# Patient Record
Sex: MALE | Race: BLACK OR AFRICAN AMERICAN | NOT HISPANIC OR LATINO | Employment: UNEMPLOYED | URBAN - METROPOLITAN AREA
[De-identification: names, ages, dates, MRNs, and addresses within clinical notes are randomized per-mention and may not be internally consistent; named-entity substitution may affect disease eponyms.]

---

## 2023-03-16 ENCOUNTER — NURSING HOME VISIT (OUTPATIENT)
Dept: POST ACUTE CARE | Facility: EXTERNAL LOCATION | Age: 83
End: 2023-03-16
Payer: COMMERCIAL

## 2023-03-16 DIAGNOSIS — D63.8 ANEMIA OF CHRONIC DISEASE: ICD-10-CM

## 2023-03-16 DIAGNOSIS — E86.0 DEHYDRATION: ICD-10-CM

## 2023-03-16 DIAGNOSIS — N18.30 STAGE 3 CHRONIC KIDNEY DISEASE, UNSPECIFIED WHETHER STAGE 3A OR 3B CKD (MULTI): ICD-10-CM

## 2023-03-16 DIAGNOSIS — N28.9 WORSENING RENAL FUNCTION: Primary | ICD-10-CM

## 2023-03-16 PROCEDURE — 99309 SBSQ NF CARE MODERATE MDM 30: CPT | Performed by: FAMILY MEDICINE

## 2023-03-16 NOTE — LETTER
Patient: Mendez Lazo  : 1940    Encounter Date: 2023    Nursing Home Visit  Name: Mendez Lazo  YOB: 1940  MRN: [id number]    Chief Complaint  Lab review/dehydration  Subjective  HPI:   83 y/o male with hx of congenital deaf/muteness (basic ASL), BPH, prostate cancers/p brachytherapy (), CKD III (baseline Cr 1.8-2.2 , atrophic left kidney), DM 2 ( A1c 6.4), Rhabdomyolysis, LIZZIE, obstructive and reflux uropathy, hypoglycemia, Difficulty walking, falls hydronephrosis, urinary retention, OA, ASHD, HLD, glaucoma, primary parkinsonism, PVD, sacral wound, moderate malnutrition admitted to SNF after hospital stay for rhabdo, debility. Per hospital records; patient was found down by landlord after an unwitnessed fall and admitted for rhabdomyolysis (CK 1725), LIZZIE(Cr to 3.03), and hypoglycemia to 40. On imaging found to have urinary retention/obstruction concerning for worsening BPH, prostate cancer, and/or UTI as well as bowel dilation and obstruction, though patient reports regular bowel movements. Per history,fall seems mechanical in nature vs hypoglycemic. Admission CT head showed surface contusion and no acute hemorrhage or infarct. Elbow and hip x-rays similarly showed no fractures, but advanced osteoarthritis was present in the bilateral hips. Admission CT AP showed Moderate hydroureteronephrosis of the left kidney with the ureter dilated to the level of the pelvic brim where there is no extrinsic compression or mass, markedly distended urinary bladder concerning for urinary retention, and hyperdense lesions of the right kidney. Indwelling catheter was inserted with 500ml output and patient was started on .4 mg Tamsulosin QD. Patient failed 9/10 TOV and was recatheterized  and discharged to SNF with indwelling catheter.  renal ultrasound showed a 0.5 x 0.4 x 0.4 cm echogenic lesion in the right midpole with a tiny focus of vascularity which was indeterminate.  Radiology recommended considering dedicated renal MRI are CT protocol to Differentiate between a small angiomyolipoma or solid neoplasm such as renal cell carcinoma, repeat atrophy left kidney, bilateral simple renal cysts, and no hydronephrosis was seen. Outpatient follow-up with urology for management of indwelling catheter and right kidney lesion. Patient likely with metabolic abnormalities(LIZZIE, hypoglycemia, rhabdo) in setting of prolonged period down after fall. On presentation, patient Cr elevated to 3.03, LIZZIE likely post-renal/obstructive given imaging, though possibly pre-renal with hypovolemia/infection, resolved to patients baseline of 2.1 by discharge. Rhabdomyolysis resolved as well after 2L fluid bolus. Patients course was complicated by a sacral decubitus ulcer, which was present on admission, but worsened during stay. Wound care nurse recommended ACS evaluation, and patient was taken to OR on 9/15 for debridement. Wound had 5cc of purulent fluid, and wound culture was sent; no obvious infection noted. Patient also found to have glucose of 40 on presentation which resolved with ampule of D 50. Patients 9/6 Hg A1c was 6.6, likely that patient had multiple hypoglycemic episodes on home regimen of Tresieba 15u AT BEDTIME, patient sent out on new regimen of 7 units Lantus. Patient was also normotensive on just home verapamil 120 mg BID, did not require home lisinopril 10 mg EVERY DAY or HYDROCHLOROTHIAZIDE 12.5 mg EVERY DAY for adequate control, which were held in setting of patients LIZZIE. Issues for follow-up. Wound care, please turn/reposition patient every 2 hours. Wound dressing needs to be changed twice a day with Kerlix barely soaking in Dakin's. Hypertension medication management and BPH follow-up with PCP in 2 weeks antihypertensive held 2/2 LIZZIE. Diabetes control follow-up with endocrine in 2 weeks LIZZIE on CKD III follow-up with nephrology in 2 weeks Indwelling catheter and right kidney lesion  follow-up with urology in 2 weeks. Failed trial of void. He was discharged to SNF on 9/17. Lost his appeal and was discharged to LTC and transferred to [facility] on 10/14. Has PICC line and Raphael. On 10/17 Patient's blood sugars noted to be elevated to a high of 787. Labs, UA were stable.  10/18 Patient is Difficult to converse with 2/2 him being deaf and unable to read. Was able to get out of him he was hungry. Appetite fair to good. Does not appear in pain.  10/20 Patient still with hyperglycemia, blood culture/wound culture done CBC w/DIFF results show anemia HGB (10.5), BC negative, wound culture Corynebacterium Striatum (negative). Appetite fair to good. Per Nsg is not in pain, stable. Patient's brother is here and wants to talk  10/25 Patient sitting in chair. He was able to make it known he wanted to get back into bed. Appetite good does not appear in pain  11/3 Patient stable, appetite good. Does not appear in pain. Is deaf mute and cannot read is difficult to communicate. Labs ordered for today  11/22 Patient stable had labs done on 11/17. Is stable, appetite good, Does not appear in distress.  11/29 Patient sitting room. He looks to be in good spirits and no distress or pain. Appetite good. Labs drawn 11/23 12/1 Patient sitting in chair, appetite fair to poor, but he is smiling trying to communicate. Has IV fluids running. Not sure which bag he is on. Hew does not look to be in distress. Has raphael catheter that intact  12/6 Patient in room, NAD, smiling. Appetite poor. Midline is partially dislodged.  12/8 Patient in room in good mood Appetite remains fair to poor. Attempting to communicate with patient while brother was in room. Brother was disruptive, making communicating with patient very difficult. Labs ordered  1/5 Notified of patient's labs yesterday. They were stable. He is in good spirits, Is deaf/mute and difficult to communicate with. He recently has had to have several liters IVF over the  past 1-2 months. He does not appear in pain or discomfort. Appetite is good per PCC.  1/26 Patient stable, sitting up in chair. In good spirits, appetite good. Denies pain or discomfort. Labs drawn today  2/28 Patient is sitting in chair, p/pleasant. No grimacing or pain noted. Labs drawn yesterday.  3/2 Notified by nursing patient complaining of abdominal discomfort. No urine noted in raphael. Nursing changed the raphael, minimal urine out. KUB and labs ordered. Saw patient later in d/ay. KUB did not mention bladder but did note bladder was mildly distended according to x-ray. No urine noted in raphael bag. Instructed nurse to irrigate raphael back and notify me with results. Patient in distress and uncomfortable. One unit of fluids per IV given to patient over night.   3/16  Patient stable, today labs have improved. Was given 1L of NS for worsening Bun/Cr.  Today labs look better, He is in good spirits, no c/o pain or discomfort. Patient is deaf/mute, does not read.      Review of Systems:  Reviewed chart looking at current medications, treatment, labs and x-rays and note pertinent positives or negatives, otherwise 10 point system review negative except for what is noted in HPI.    Objective  VS: 129/78, 97.7, 78, 18, 98%, 146#  Physical exam:   Physical Exam   Constitutional: General appearance: comfortable and appears stated age.  Head and Face: NC, AT.  Eyes: Sclera clear; No drainage.  Ears, Nose, Mouth, and Throat: No ear or nasal drainage. Deaf, does not read, communicates with gestures, basic ASL. MMM, poor dentition.  Pulmonary: Air exchange and excursion adequate.  Cardiovascular: No peripheral edema.  Abdomen: lower abdomen mildly distended, uncomfortable for patient.  Genitourinary: Raphael draining clear yellow urine.  Musculoskeletal: No gross bony deformities, no kyphosis.  Skin: skin with right foot wound and sacral wound not observed, per Nsg and wound team.  Neurologic: unable to assess. Patient is  deaf/mute. gait not observed.  Psychiatric: Calm and pleasant.    Assessment/Pl82 y/o male with hx of congenital deaf/muteness (basic ASL), BPH, prostate cancer s/p brachytherapy (2008), CKD III (baseline Cr 1.8-2.2 1/22, atrophic left kidney), DM 2 (1/22 A1c 6.4), Rhabdomyolysis, LIZZIE, obstructive and reflux uropathy, hypoglycemia, difficulty walking, falls hydronephrosis, urinary retention, OA, ASHD, HLD, glaucoma, primary parkinsonism, PVD, sacral wound, moderate malnutrition admitted to SNF after hospital stay for rhabdo, debility.  * Unable to urinate, abdomen mildly distended, nursing unable to irrigate raphael. Patient transferred to [facility].  * Hyperkalemia- K+4.4, Improved after fluids  * Worsening renal function BUN/Cr- improved 49/2.21, today  * Hyponatremia- Na+ 137, better after fluids  * Hyperglycemia/DM II- -200s c/w Lantus insulin and Lispro for coverage, no change  * Debility- therapy to maximize safety and independence with functional mobility and self-care. Is functioning well  * HTN, ASHD, HLD- VSS c/w verapamil, ASA, atorvastatin.  * BPH/obstructive uropathy- c/w Flomax, and Raphael catheter.  * Anemia- H/H 8.4/27.7 down trending, monitor repeat next week  * Sacral wound/foot wound- per Nsg/wound team  * Knee right knee/low back pain- c/w lidocaine patch, routine Tylenol, gabapentin  * Glaucoma- c/w with eye gtts.  Had consult for renal trending BUN/Cr, labs chronically poor  Labs for next week    Blanca Stafford CNP    Electronically Signed By: KATE Easley-CNP   3/26/23 12:26 PM

## 2023-03-21 ENCOUNTER — NURSING HOME VISIT (OUTPATIENT)
Dept: POST ACUTE CARE | Facility: EXTERNAL LOCATION | Age: 83
End: 2023-03-21
Payer: COMMERCIAL

## 2023-03-21 DIAGNOSIS — N18.30 STAGE 3 CHRONIC KIDNEY DISEASE, UNSPECIFIED WHETHER STAGE 3A OR 3B CKD (MULTI): Primary | ICD-10-CM

## 2023-03-21 DIAGNOSIS — D63.8 ANEMIA OF CHRONIC DISEASE: ICD-10-CM

## 2023-03-21 PROCEDURE — 99309 SBSQ NF CARE MODERATE MDM 30: CPT | Performed by: FAMILY MEDICINE

## 2023-03-21 NOTE — LETTER
Patient: Mendez Lazo  : 1940    Encounter Date: 2023    Nursing Home Visit  Name: Mendez Lazo  YOB: 1940  MRN: [id number]    Chief Complaint  Lab Review  Subjective  HPI:   83 y/o male with hx of congenital deaf/muteness (basic ASL), BPH, prostate cancers/p brachytherapy (), CKD III (baseline Cr 1.8-2.2 , atrophic left kidney), DM 2 ( A1c 6.4), Rhabdomyolysis, LIZZIE, obstructive and reflux uropathy, hypoglycemia, Difficulty walking, falls hydronephrosis, urinary retention, OA, ASHD, HLD, glaucoma, primary parkinsonism, PVD, sacral wound, moderate malnutrition admitted to SNF after hospital stay for rhabdo, debility. Per hospital records; patient was found down by landlord after an unwitnessed fall and admitted for rhabdomyolysis (CK 1725), LIZZIE(Cr to 3.03), and hypoglycemia to 40. On imaging found to have urinary retention/obstruction concerning for worsening BPH, prostate cancer, and/or UTI as well as bowel dilation and obstruction, though patient reports regular bowel movements. Per history,fall seems mechanical in nature vs hypoglycemic. Admission CT head showed surface contusion and no acute hemorrhage or infarct. Elbow and hip x-rays similarly showed no fractures, but advanced osteoarthritis was present in the bilateral hips. Admission CT AP showed Moderate hydroureteronephrosis of the left kidney with the ureter dilated to the level of the pelvic brim where there is no extrinsic compression or mass, markedly distended urinary bladder concerning for urinary retention, and hyperdense lesions of the right kidney. Indwelling catheter was inserted with 500ml output and patient was started on .4 mg Tamsulosin QD. Patient failed 9/10 TOV and was recatheterized  and discharged to SNF with indwelling catheter.  renal ultrasound showed a 0.5 x 0.4 x 0.4 cm echogenic lesion in the right midpole with a tiny focus of vascularity which was indeterminate. Radiology  recommended considering dedicated renal MRI are CT protocol to Differentiate between a small angiomyolipoma or solid neoplasm such as renal cell carcinoma, repeat atrophy left kidney, bilateral simple renal cysts, and no hydronephrosis was seen. Outpatient follow-up with urology for management of indwelling catheter and right kidney lesion. Patient likely with metabolic abnormalities(LIZZIE, hypoglycemia, rhabdo) in setting of prolonged period down after fall. On presentation, patient Cr elevated to 3.03, LIZZIE likely post-renal/obstructive given imaging, though possibly pre-renal with hypovolemia/infection, resolved to patients baseline of 2.1 by discharge. Rhabdomyolysis resolved as well after 2L fluid bolus. Patients course was complicated by a sacral decubitus ulcer, which was present on admission, but worsened during stay. Wound care nurse recommended ACS evaluation, and patient was taken to OR on 9/15 for debridement. Wound had 5cc of purulent fluid, and wound culture was sent; no obvious infection noted. Patient also found to have glucose of 40 on presentation which resolved with ampule of D 50. Patients 9/6 Hg A1c was 6.6, likely that patient had multiple hypoglycemic episodes on home regimen of Tresieba 15u AT BEDTIME, patient sent out on new regimen of 7 units Lantus. Patient was also normotensive on just home verapamil 120 mg BID, did not require home lisinopril 10 mg EVERY DAY or HYDROCHLOROTHIAZIDE 12.5 mg EVERY DAY for adequate control, which were held in setting of patients LIZZIE. Issues for follow-up. Wound care, please turn/reposition patient every 2 hours. Wound dressing needs to be changed twice a day with Kerlix barely soaking in Dakin's. Hypertension medication management and BPH follow-up with PCP in 2 weeks antihypertensive held 2/2 LIZZIE. Diabetes control follow-up with endocrine in 2 weeks LIZZIE on CKD III follow-up with nephrology in 2 weeks Indwelling catheter and right kidney lesion follow-up with  urology in 2 weeks. Failed trial of void. He was discharged to SNF on 9/17. Lost his appeal and was discharged to LTC and transferred to [facility] on 10/14. Has PICC line and Raphael. On 10/17 Patient's blood sugars noted to be elevated to a high of 787. Labs, UA were stable.  10/18 Patient is Difficult to converse with 2/2 him being deaf and unable to read. Was able to get out of him he was hungry. Appetite fair to good. Does not appear in pain.  10/20 Patient still with hyperglycemia, blood culture/wound culture done CBC w/DIFF results show anemia HGB (10.5), BC negative, wound culture Corynebacterium Striatum (negative). Appetite fair to good. Per Nsg is not in pain, stable. Patient's brother is here and wants to talk  10/25 Patient sitting in chair. He was able to make it known he wanted to get back into bed. Appetite good does not appear in pain  11/3 Patient stable, appetite good. Does not appear in pain. Is deaf mute and cannot read is difficult to communicate. Labs ordered for today  11/22 Patient stable had labs done on 11/17. Is stable, appetite good, Does not appear in distress.  11/29 Patient sitting room. He looks to be in good spirits and no distress or pain. Appetite good. Labs drawn 11/23 12/1 Patient sitting in chair, appetite fair to poor, but he is smiling trying to communicate. Has IV fluids running. Not sure which bag he is on. Hew does not look to be in distress. Has raphael catheter that intact  12/6 Patient in room, NAD, smiling. Appetite poor. Midline is partially dislodged.  12/8 Patient in room in good mood Appetite remains fair to poor. Attempting to communicate with patient while brother was in room. Brother was disruptive, making communicating with patient very difficult. Labs ordered  1/5 Notified of patient's labs yesterday. They were stable. He is in good spirits, Is deaf/mute and difficult to communicate with. He recently has had to have several liters IVF over the past 1-2 months.  "He does not appear in pain or discomfort. Appetite is good per PCC.  1/26 Patient stable, sitting up in chair. In good spirits, appetite good. Denies pain or discomfort. Labs drawn today  2/28 Patient is sitting in chair, p/pleasant. No grimacing or pain noted. Labs drawn yesterday.  3/2 Notified by nursing patient complaining of abdominal discomfort. No urine noted in raphael. Nursing changed the raphael, minimal urine out. KUB and labs ordered. Saw patient later in d/ay. KUB did not mention bladder but did note bladder was mildly distended according to x-ray. No urine noted in raphael bag. Instructed nurse to irrigate raphael back and notify me with results. Patient in distress and uncomfortable. One unit of fluids per IV given to patient over night.   3/16  Patient stable, today labs have improved. Was given 1L of NS for worsening Bun/Cr.  Today labs look better, He is in good spirits, no c/o pain or discomfort. Patient is deaf/mute, does not read.   3/21  Patient sitting out in common area, is in good spirits.  Labs remain stable. Appetite is good. Shakes head \"yes\" when asked if he is feeling good.        Review of Systems:  Reviewed chart looking at current medications, treatment, labs and x-rays and note pertinent positives or negatives, otherwise 10 point system review negative except for what is noted in HPI.    Objective    VS: /78   Pulse 78   Temp 36.4 °C (97.6 °F)   Resp 18   Wt 66.2 kg (146 lb)   SpO2 97%     Physical exam:   Physical Exam   Constitutional: General appearance: comfortable and appears stated age.  Head and Face: NC, AT.  Eyes: Sclera clear; No drainage.  Ears, Nose, Mouth, and Throat: No ear or nasal drainage. Deaf, does not read, communicates with gestures, basic ASL. MMM, poor dentition.  Pulmonary: Air exchange and excursion adequate.  Cardiovascular: No peripheral edema.  Abdomen: lower abdomen mildly distended, no longer painful.  Genitourinary: Raphael draining clear yellow " urine.  Musculoskeletal: No gross bony deformities, no kyphosis.  Skin: skin with right foot wound and sacral wound not observed, per Nsg and wound team.  Neurologic: unable to assess. Patient is deaf/mute. gait not observed.  Psychiatric: Calm and pleasant.    Assessment/Plan   83 y/o male with hx of congenital deaf/muteness (basic ASL), BPH, prostate cancer s/p brachytherapy (2008), CKD III (baseline Cr 1.8-2.2 1/22, atrophic left kidney), DM 2 (1/22 A1c 6.4), Rhabdomyolysis, LIZZIE, obstructive and reflux uropathy, hypoglycemia, difficulty walking, falls hydronephrosis, urinary retention, OA, ASHD, HLD, glaucoma, primary parkinsonism, PVD, sacral wound, moderate malnutrition admitted to SNF after hospital stay for rhabdo, debility.  * CKD III- Creatinine trending down, BUN remains elevated, Chlor/C02- mildly abnormal 2/2 CKD  * Hyperkalemia- K+4.3, stable  * Hyponatremia- Na+ 138 stable  * Hyperglycemia/DM II- -200s c/w Lantus insulin and Lispro for coverage, no change  * Debility- therapy to maximize safety and independence with functional mobility and self-care. Is functioning well  * HTN, ASHD, HLD- VSS c/w verapamil, ASA, atorvastatin.  * BPH/obstructive uropathy- c/w Flomax, and Smith catheter.  * Anemia 2/2 chronic disease H/H 8.7/28.5   * Sacral wound/foot wound- per Nsg/wound team  * Knee right knee/low back pain- c/w lidocaine patch, routine Tylenol, gabapentin  * Glaucoma- c/w with eye gtts.  Had consult for renal trending BUN/Cr, labs chronically poor, unsure if patient went, unable to locate documentation  Labs for next week    Blanca Stafford CNP       Electronically Signed By: KATE Easley-CNP   3/27/23  2:16 PM

## 2023-03-23 ENCOUNTER — NURSING HOME VISIT (OUTPATIENT)
Dept: POST ACUTE CARE | Facility: EXTERNAL LOCATION | Age: 83
End: 2023-03-23
Payer: COMMERCIAL

## 2023-03-23 PROCEDURE — 99310 SBSQ NF CARE HIGH MDM 45: CPT | Performed by: FAMILY MEDICINE

## 2023-03-23 NOTE — LETTER
Patient: Mendez Lazo  : 1940    Encounter Date: 2023    Nursing Home Visit  Name: Mendez Lazo  YOB: 1940  MRN: [id number]     Chief Complaint  Lab Review  Subjective  HPI:   81 y/o male with hx of congenital deaf/muteness (basic ASL), BPH, prostate cancers/p brachytherapy (), CKD III (baseline Cr 1.8-2.2 , atrophic left kidney), DM 2 ( A1c 6.4), Rhabdomyolysis, LIZZIE, obstructive and reflux uropathy, hypoglycemia, Difficulty walking, falls hydronephrosis, urinary retention, OA, ASHD, HLD, glaucoma, primary parkinsonism, PVD, sacral wound, moderate malnutrition admitted to SNF after hospital stay for rhabdo, debility. Per hospital records; patient was found down by landlord after an unwitnessed fall and admitted for rhabdomyolysis (CK 1725), LIZZIE(Cr to 3.03), and hypoglycemia to 40. On imaging found to have urinary retention/obstruction concerning for worsening BPH, prostate cancer, and/or UTI as well as bowel dilation and obstruction, though patient reports regular bowel movements. Per history,fall seems mechanical in nature vs hypoglycemic. Admission CT head showed surface contusion and no acute hemorrhage or infarct. Elbow and hip x-rays similarly showed no fractures, but advanced osteoarthritis was present in the bilateral hips. Admission CT AP showed Moderate hydroureteronephrosis of the left kidney with the ureter dilated to the level of the pelvic brim where there is no extrinsic compression or mass, markedly distended urinary bladder concerning for urinary retention, and hyperdense lesions of the right kidney. Indwelling catheter was inserted with 500ml output and patient was started on .4 mg Tamsulosin QD. Patient failed 9/10 TOV and was recatheterized  and discharged to SNF with indwelling catheter.  renal ultrasound showed a 0.5 x 0.4 x 0.4 cm echogenic lesion in the right midpole with a tiny focus of vascularity which was indeterminate. Radiology  recommended considering dedicated renal MRI are CT protocol to Differentiate between a small angiomyolipoma or solid neoplasm such as renal cell carcinoma, repeat atrophy left kidney, bilateral simple renal cysts, and no hydronephrosis was seen. Outpatient follow-up with urology for management of indwelling catheter and right kidney lesion. Patient likely with metabolic abnormalities(LIZZIE, hypoglycemia, rhabdo) in setting of prolonged period down after fall. On presentation, patient Cr elevated to 3.03, LIZZIE likely post-renal/obstructive given imaging, though possibly pre-renal with hypovolemia/infection, resolved to patients baseline of 2.1 by discharge. Rhabdomyolysis resolved as well after 2L fluid bolus. Patients course was complicated by a sacral decubitus ulcer, which was present on admission, but worsened during stay. Wound care nurse recommended ACS evaluation, and patient was taken to OR on 9/15 for debridement. Wound had 5cc of purulent fluid, and wound culture was sent; no obvious infection noted. Patient also found to have glucose of 40 on presentation which resolved with ampule of D 50. Patients 9/6 Hg A1c was 6.6, likely that patient had multiple hypoglycemic episodes on home regimen of Tresieba 15u AT BEDTIME, patient sent out on new regimen of 7 units Lantus. Patient was also normotensive on just home verapamil 120 mg BID, did not require home lisinopril 10 mg EVERY DAY or HYDROCHLOROTHIAZIDE 12.5 mg EVERY DAY for adequate control, which were held in setting of patients LIZZIE. Issues for follow-up. Wound care, please turn/reposition patient every 2 hours. Wound dressing needs to be changed twice a day with Kerlix barely soaking in Dakin's. Hypertension medication management and BPH follow-up with PCP in 2 weeks antihypertensive held 2/2 LIZZIE. Diabetes control follow-up with endocrine in 2 weeks LIZZIE on CKD III follow-up with nephrology in 2 weeks Indwelling catheter and right kidney lesion follow-up with  urology in 2 weeks. Failed trial of void. He was discharged to SNF on 9/17. Lost his appeal and was discharged to LTC and transferred to [facility] on 10/14. Has PICC line and Raphael. On 10/17 Patient's blood sugars noted to be elevated to a high of 787. Labs, UA were stable.  10/18 Patient is Difficult to converse with 2/2 him being deaf and unable to read. Was able to get out of him he was hungry. Appetite fair to good. Does not appear in pain.  10/20 Patient still with hyperglycemia, blood culture/wound culture done CBC w/DIFF results show anemia HGB (10.5), BC negative, wound culture Corynebacterium Striatum (negative). Appetite fair to good. Per Nsg is not in pain, stable. Patient's brother is here and wants to talk  10/25 Patient sitting in chair. He was able to make it known he wanted to get back into bed. Appetite good does not appear in pain  11/3 Patient stable, appetite good. Does not appear in pain. Is deaf mute and cannot read is difficult to communicate. Labs ordered for today  11/22 Patient stable had labs done on 11/17. Is stable, appetite good, Does not appear in distress.  11/29 Patient sitting room. He looks to be in good spirits and no distress or pain. Appetite good. Labs drawn 11/23 12/1 Patient sitting in chair, appetite fair to poor, but he is smiling trying to communicate. Has IV fluids running. Not sure which bag he is on. Hew does not look to be in distress. Has raphael catheter that intact  12/6 Patient in room, NAD, smiling. Appetite poor. Midline is partially dislodged.  12/8 Patient in room in good mood Appetite remains fair to poor. Attempting to communicate with patient while brother was in room. Brother was disruptive, making communicating with patient very difficult. Labs ordered  1/5 Notified of patient's labs yesterday. They were stable. He is in good spirits, Is deaf/mute and difficult to communicate with. He recently has had to have several liters IVF over the past 1-2 months.  "He does not appear in pain or discomfort. Appetite is good per PCC.  1/26 Patient stable, sitting up in chair. In good spirits, appetite good. Denies pain or discomfort. Labs drawn today  2/28 Patient is sitting in chair, p/pleasant. No grimacing or pain noted. Labs drawn yesterday.  3/2 Notified by nursing patient complaining of abdominal discomfort. No urine noted in raphael. Nursing changed the raphael, minimal urine out. KUB and labs ordered. Saw patient later in d/ay. KUB did not mention bladder but did note bladder was mildly distended according to x-ray. No urine noted in raphael bag. Instructed nurse to irrigate raphael back and notify me with results. Patient in distress and uncomfortable. One unit of fluids per IV given to patient over night.   3/16  Patient stable, today labs have improved. Was given 1L of NS for worsening Bun/Cr.  Today labs look better, He is in good spirits, no c/o pain or discomfort. Patient is deaf/mute, does not read.   3/21  Patient sitting out in common area, is in good spirits.  Labs remain stable. Appetite is good. Shakes head \"yes\" when asked if he is feeling good.      3/23  Was at another facility when was I was notified patient was coding.  Arrived to see EMS with patient. KEVIN device was already in place giving chest compressions. In discussions wit nsg.  Patient was awake earlier in the day, he returned to his room before lunch.  He usually sits in chair in room to eat.  Per [facility] lunch tray was brought to his room, she thought he was sleeping, but realized he was not breathing.  Nsg states they were able to get a blood pressure and heart rate. CPR was initiated.  In discussion with EMS patient was asystole when they arrived and they never got a rhythm.  Code was called.  Patient was not transferred to hospital.  Family was notified by [facility] staff.  Patient was diagnosed with Covid yesterday   Discussed with [facility] staff, EMTs and notified Dr. Díaz.    Blanca Stafford, " CNP      Electronically Signed By: RICO Easley   4/3/23  9:21 PM

## 2023-03-26 NOTE — PROGRESS NOTES
Nursing Home Visit  Name: Mendez Lazo  YOB: 1940  MRN: [id number]    Chief Complaint  Lab review/dehydration  Subjective  HPI:   81 y/o male with hx of congenital deaf/muteness (basic ASL), BPH, prostate cancers/p brachytherapy (2008), CKD III (baseline Cr 1.8-2.2 1/22, atrophic left kidney), DM 2 (1/22 A1c 6.4), Rhabdomyolysis, LIZZIE, obstructive and reflux uropathy, hypoglycemia, Difficulty walking, falls hydronephrosis, urinary retention, OA, ASHD, HLD, glaucoma, primary parkinsonism, PVD, sacral wound, moderate malnutrition admitted to SNF after hospital stay for rhabdo, debility. Per hospital records; patient was found down by landlord after an unwitnessed fall and admitted for rhabdomyolysis (CK 1725), LIZZIE(Cr to 3.03), and hypoglycemia to 40. On imaging found to have urinary retention/obstruction concerning for worsening BPH, prostate cancer, and/or UTI as well as bowel dilation and obstruction, though patient reports regular bowel movements. Per history,fall seems mechanical in nature vs hypoglycemic. Admission CT head showed surface contusion and no acute hemorrhage or infarct. Elbow and hip x-rays similarly showed no fractures, but advanced osteoarthritis was present in the bilateral hips. Admission CT AP showed Moderate hydroureteronephrosis of the left kidney with the ureter dilated to the level of the pelvic brim where there is no extrinsic compression or mass, markedly distended urinary bladder concerning for urinary retention, and hyperdense lesions of the right kidney. Indwelling catheter was inserted with 500ml output and patient was started on .4 mg Tamsulosin QD. Patient failed 9/10 TOV and was recatheterized 9/11 and discharged to SNF with indwelling catheter. 9/12 renal ultrasound showed a 0.5 x 0.4 x 0.4 cm echogenic lesion in the right midpole with a tiny focus of vascularity which was indeterminate. Radiology recommended considering dedicated renal MRI are CT protocol to  Differentiate between a small angiomyolipoma or solid neoplasm such as renal cell carcinoma, repeat atrophy left kidney, bilateral simple renal cysts, and no hydronephrosis was seen. Outpatient follow-up with urology for management of indwelling catheter and right kidney lesion. Patient likely with metabolic abnormalities(LIZZIE, hypoglycemia, rhabdo) in setting of prolonged period down after fall. On presentation, patient Cr elevated to 3.03, LIZZIE likely post-renal/obstructive given imaging, though possibly pre-renal with hypovolemia/infection, resolved to patients baseline of 2.1 by discharge. Rhabdomyolysis resolved as well after 2L fluid bolus. Patients course was complicated by a sacral decubitus ulcer, which was present on admission, but worsened during stay. Wound care nurse recommended ACS evaluation, and patient was taken to OR on 9/15 for debridement. Wound had 5cc of purulent fluid, and wound culture was sent; no obvious infection noted. Patient also found to have glucose of 40 on presentation which resolved with ampule of D 50. Patients 9/6 Hg A1c was 6.6, likely that patient had multiple hypoglycemic episodes on home regimen of Tresieba 15u AT BEDTIME, patient sent out on new regimen of 7 units Lantus. Patient was also normotensive on just home verapamil 120 mg BID, did not require home lisinopril 10 mg EVERY DAY or HYDROCHLOROTHIAZIDE 12.5 mg EVERY DAY for adequate control, which were held in setting of patients LIZZIE. Issues for follow-up. Wound care, please turn/reposition patient every 2 hours. Wound dressing needs to be changed twice a day with Kerlix barely soaking in Dakin's. Hypertension medication management and BPH follow-up with PCP in 2 weeks antihypertensive held 2/2 LIZZIE. Diabetes control follow-up with endocrine in 2 weeks LIZZIE on CKD III follow-up with nephrology in 2 weeks Indwelling catheter and right kidney lesion follow-up with urology in 2 weeks. Failed trial of void. He was discharged to  SNF on 9/17. Lost his appeal and was discharged to LTC and transferred to [facility] on 10/14. Has PICC line and Raphael. On 10/17 Patient's blood sugars noted to be elevated to a high of 787. Labs, UA were stable.  10/18 Patient is Difficult to converse with 2/2 him being deaf and unable to read. Was able to get out of him he was hungry. Appetite fair to good. Does not appear in pain.  10/20 Patient still with hyperglycemia, blood culture/wound culture done CBC w/DIFF results show anemia HGB (10.5), BC negative, wound culture Corynebacterium Striatum (negative). Appetite fair to good. Per Nsg is not in pain, stable. Patient's brother is here and wants to talk  10/25 Patient sitting in chair. He was able to make it known he wanted to get back into bed. Appetite good does not appear in pain  11/3 Patient stable, appetite good. Does not appear in pain. Is deaf mute and cannot read is difficult to communicate. Labs ordered for today  11/22 Patient stable had labs done on 11/17. Is stable, appetite good, Does not appear in distress.  11/29 Patient sitting room. He looks to be in good spirits and no distress or pain. Appetite good. Labs drawn 11/23 12/1 Patient sitting in chair, appetite fair to poor, but he is smiling trying to communicate. Has IV fluids running. Not sure which bag he is on. Hew does not look to be in distress. Has raphael catheter that intact  12/6 Patient in room, NAD, smiling. Appetite poor. Midline is partially dislodged.  12/8 Patient in room in good mood Appetite remains fair to poor. Attempting to communicate with patient while brother was in room. Brother was disruptive, making communicating with patient very difficult. Labs ordered  1/5 Notified of patient's labs yesterday. They were stable. He is in good spirits, Is deaf/mute and difficult to communicate with. He recently has had to have several liters IVF over the past 1-2 months. He does not appear in pain or discomfort. Appetite is good per  PCC.  1/26 Patient stable, sitting up in chair. In good spirits, appetite good. Denies pain or discomfort. Labs drawn today  2/28 Patient is sitting in chair, p/pleasant. No grimacing or pain noted. Labs drawn yesterday.  3/2 Notified by nursing patient complaining of abdominal discomfort. No urine noted in raphael. Nursing changed the raphael, minimal urine out. KUB and labs ordered. Saw patient later in d/ay. KUB did not mention bladder but did note bladder was mildly distended according to x-ray. No urine noted in raphael bag. Instructed nurse to irrigate raphael back and notify me with results. Patient in distress and uncomfortable. One unit of fluids per IV given to patient over night.   3/16  Patient stable, today labs have improved. Was given 1L of NS for worsening Bun/Cr.  Today labs look better, He is in good spirits, no c/o pain or discomfort. Patient is deaf/mute, does not read.      Review of Systems:  Reviewed chart looking at current medications, treatment, labs and x-rays and note pertinent positives or negatives, otherwise 10 point system review negative except for what is noted in HPI.    Objective  VS: 129/78, 97.7, 78, 18, 98%, 146#  Physical exam:   Physical Exam   Constitutional: General appearance: comfortable and appears stated age.  Head and Face: NC, AT.  Eyes: Sclera clear; No drainage.  Ears, Nose, Mouth, and Throat: No ear or nasal drainage. Deaf, does not read, communicates with gestures, basic ASL. MMM, poor dentition.  Pulmonary: Air exchange and excursion adequate.  Cardiovascular: No peripheral edema.  Abdomen: lower abdomen mildly distended, uncomfortable for patient.  Genitourinary: Raphael draining clear yellow urine.  Musculoskeletal: No gross bony deformities, no kyphosis.  Skin: skin with right foot wound and sacral wound not observed, per Nsg and wound team.  Neurologic: unable to assess. Patient is deaf/mute. gait not observed.  Psychiatric: Calm and pleasant.    Assessment/Pl82 y/o  male with hx of congenital deaf/muteness (basic ASL), BPH, prostate cancer s/p brachytherapy (2008), CKD III (baseline Cr 1.8-2.2 1/22, atrophic left kidney), DM 2 (1/22 A1c 6.4), Rhabdomyolysis, LIZZIE, obstructive and reflux uropathy, hypoglycemia, difficulty walking, falls hydronephrosis, urinary retention, OA, ASHD, HLD, glaucoma, primary parkinsonism, PVD, sacral wound, moderate malnutrition admitted to SNF after hospital stay for rhabdo, debility.  * Unable to urinate, abdomen mildly distended, nursing unable to irrigate raphael. Patient transferred to [facility].  * Hyperkalemia- K+4.4, Improved after fluids  * Worsening renal function BUN/Cr- improved 49/2.21, today  * Hyponatremia- Na+ 137, better after fluids  * Hyperglycemia/DM II- -200s c/w Lantus insulin and Lispro for coverage, no change  * Debility- therapy to maximize safety and independence with functional mobility and self-care. Is functioning well  * HTN, ASHD, HLD- VSS c/w verapamil, ASA, atorvastatin.  * BPH/obstructive uropathy- c/w Flomax, and Raphael catheter.  * Anemia- H/H 8.4/27.7 down trending, monitor repeat next week  * Sacral wound/foot wound- per Nsg/wound team  * Knee right knee/low back pain- c/w lidocaine patch, routine Tylenol, gabapentin  * Glaucoma- c/w with eye gtts.  Had consult for renal trending BUN/Cr, labs chronically poor  Labs for next week    Blanca Stafford, CNP

## 2023-03-27 VITALS
HEART RATE: 78 BPM | TEMPERATURE: 97.6 F | SYSTOLIC BLOOD PRESSURE: 129 MMHG | OXYGEN SATURATION: 97 % | DIASTOLIC BLOOD PRESSURE: 78 MMHG | WEIGHT: 146 LBS | RESPIRATION RATE: 18 BRPM

## 2023-03-27 NOTE — PROGRESS NOTES
Nursing Home Visit  Name: Mendez Lazo  YOB: 1940  MRN: [id number]    Chief Complaint  Lab Review  Subjective  HPI:   83 y/o male with hx of congenital deaf/muteness (basic ASL), BPH, prostate cancers/p brachytherapy (2008), CKD III (baseline Cr 1.8-2.2 1/22, atrophic left kidney), DM 2 (1/22 A1c 6.4), Rhabdomyolysis, LIZZIE, obstructive and reflux uropathy, hypoglycemia, Difficulty walking, falls hydronephrosis, urinary retention, OA, ASHD, HLD, glaucoma, primary parkinsonism, PVD, sacral wound, moderate malnutrition admitted to SNF after hospital stay for rhabdo, debility. Per hospital records; patient was found down by landlord after an unwitnessed fall and admitted for rhabdomyolysis (CK 1725), LIZZIE(Cr to 3.03), and hypoglycemia to 40. On imaging found to have urinary retention/obstruction concerning for worsening BPH, prostate cancer, and/or UTI as well as bowel dilation and obstruction, though patient reports regular bowel movements. Per history,fall seems mechanical in nature vs hypoglycemic. Admission CT head showed surface contusion and no acute hemorrhage or infarct. Elbow and hip x-rays similarly showed no fractures, but advanced osteoarthritis was present in the bilateral hips. Admission CT AP showed Moderate hydroureteronephrosis of the left kidney with the ureter dilated to the level of the pelvic brim where there is no extrinsic compression or mass, markedly distended urinary bladder concerning for urinary retention, and hyperdense lesions of the right kidney. Indwelling catheter was inserted with 500ml output and patient was started on .4 mg Tamsulosin QD. Patient failed 9/10 TOV and was recatheterized 9/11 and discharged to SNF with indwelling catheter. 9/12 renal ultrasound showed a 0.5 x 0.4 x 0.4 cm echogenic lesion in the right midpole with a tiny focus of vascularity which was indeterminate. Radiology recommended considering dedicated renal MRI are CT protocol to Differentiate  between a small angiomyolipoma or solid neoplasm such as renal cell carcinoma, repeat atrophy left kidney, bilateral simple renal cysts, and no hydronephrosis was seen. Outpatient follow-up with urology for management of indwelling catheter and right kidney lesion. Patient likely with metabolic abnormalities(LIZZIE, hypoglycemia, rhabdo) in setting of prolonged period down after fall. On presentation, patient Cr elevated to 3.03, LIZZIE likely post-renal/obstructive given imaging, though possibly pre-renal with hypovolemia/infection, resolved to patients baseline of 2.1 by discharge. Rhabdomyolysis resolved as well after 2L fluid bolus. Patients course was complicated by a sacral decubitus ulcer, which was present on admission, but worsened during stay. Wound care nurse recommended ACS evaluation, and patient was taken to OR on 9/15 for debridement. Wound had 5cc of purulent fluid, and wound culture was sent; no obvious infection noted. Patient also found to have glucose of 40 on presentation which resolved with ampule of D 50. Patients 9/6 Hg A1c was 6.6, likely that patient had multiple hypoglycemic episodes on home regimen of Tresieba 15u AT BEDTIME, patient sent out on new regimen of 7 units Lantus. Patient was also normotensive on just home verapamil 120 mg BID, did not require home lisinopril 10 mg EVERY DAY or HYDROCHLOROTHIAZIDE 12.5 mg EVERY DAY for adequate control, which were held in setting of patients LIZZIE. Issues for follow-up. Wound care, please turn/reposition patient every 2 hours. Wound dressing needs to be changed twice a day with Kerlix barely soaking in Dakin's. Hypertension medication management and BPH follow-up with PCP in 2 weeks antihypertensive held 2/2 LIZZIE. Diabetes control follow-up with endocrine in 2 weeks LIZZIE on CKD III follow-up with nephrology in 2 weeks Indwelling catheter and right kidney lesion follow-up with urology in 2 weeks. Failed trial of void. He was discharged to SNF on 9/17.  Lost his appeal and was discharged to LTC and transferred to [facility] on 10/14. Has PICC line and Raphael. On 10/17 Patient's blood sugars noted to be elevated to a high of 787. Labs, UA were stable.  10/18 Patient is Difficult to converse with 2/2 him being deaf and unable to read. Was able to get out of him he was hungry. Appetite fair to good. Does not appear in pain.  10/20 Patient still with hyperglycemia, blood culture/wound culture done CBC w/DIFF results show anemia HGB (10.5), BC negative, wound culture Corynebacterium Striatum (negative). Appetite fair to good. Per Nsg is not in pain, stable. Patient's brother is here and wants to talk  10/25 Patient sitting in chair. He was able to make it known he wanted to get back into bed. Appetite good does not appear in pain  11/3 Patient stable, appetite good. Does not appear in pain. Is deaf mute and cannot read is difficult to communicate. Labs ordered for today  11/22 Patient stable had labs done on 11/17. Is stable, appetite good, Does not appear in distress.  11/29 Patient sitting room. He looks to be in good spirits and no distress or pain. Appetite good. Labs drawn 11/23 12/1 Patient sitting in chair, appetite fair to poor, but he is smiling trying to communicate. Has IV fluids running. Not sure which bag he is on. Hew does not look to be in distress. Has raphael catheter that intact  12/6 Patient in room, NAD, smiling. Appetite poor. Midline is partially dislodged.  12/8 Patient in room in good mood Appetite remains fair to poor. Attempting to communicate with patient while brother was in room. Brother was disruptive, making communicating with patient very difficult. Labs ordered  1/5 Notified of patient's labs yesterday. They were stable. He is in good spirits, Is deaf/mute and difficult to communicate with. He recently has had to have several liters IVF over the past 1-2 months. He does not appear in pain or discomfort. Appetite is good per PCC.  1/26  "Patient stable, sitting up in chair. In good spirits, appetite good. Denies pain or discomfort. Labs drawn today  2/28 Patient is sitting in chair, p/pleasant. No grimacing or pain noted. Labs drawn yesterday.  3/2 Notified by nursing patient complaining of abdominal discomfort. No urine noted in raphael. Nursing changed the raphael, minimal urine out. KUB and labs ordered. Saw patient later in d/ay. KUB did not mention bladder but did note bladder was mildly distended according to x-ray. No urine noted in raphael bag. Instructed nurse to irrigate raphael back and notify me with results. Patient in distress and uncomfortable. One unit of fluids per IV given to patient over night.   3/16  Patient stable, today labs have improved. Was given 1L of NS for worsening Bun/Cr.  Today labs look better, He is in good spirits, no c/o pain or discomfort. Patient is deaf/mute, does not read.   3/21  Patient sitting out in common area, is in good spirits.  Labs remain stable. Appetite is good. Shakes head \"yes\" when asked if he is feeling good.        Review of Systems:  Reviewed chart looking at current medications, treatment, labs and x-rays and note pertinent positives or negatives, otherwise 10 point system review negative except for what is noted in HPI.    Objective    VS: /78   Pulse 78   Temp 36.4 °C (97.6 °F)   Resp 18   Wt 66.2 kg (146 lb)   SpO2 97%     Physical exam:   Physical Exam   Constitutional: General appearance: comfortable and appears stated age.  Head and Face: NC, AT.  Eyes: Sclera clear; No drainage.  Ears, Nose, Mouth, and Throat: No ear or nasal drainage. Deaf, does not read, communicates with gestures, basic ASL. MMM, poor dentition.  Pulmonary: Air exchange and excursion adequate.  Cardiovascular: No peripheral edema.  Abdomen: lower abdomen mildly distended, no longer painful.  Genitourinary: Raphael draining clear yellow urine.  Musculoskeletal: No gross bony deformities, no kyphosis.  Skin: skin " with right foot wound and sacral wound not observed, per Nsg and wound team.  Neurologic: unable to assess. Patient is deaf/mute. gait not observed.  Psychiatric: Calm and pleasant.    Assessment/Plan   83 y/o male with hx of congenital deaf/muteness (basic ASL), BPH, prostate cancer s/p brachytherapy (2008), CKD III (baseline Cr 1.8-2.2 1/22, atrophic left kidney), DM 2 (1/22 A1c 6.4), Rhabdomyolysis, LIZZIE, obstructive and reflux uropathy, hypoglycemia, difficulty walking, falls hydronephrosis, urinary retention, OA, ASHD, HLD, glaucoma, primary parkinsonism, PVD, sacral wound, moderate malnutrition admitted to SNF after hospital stay for rhabdo, debility.  * CKD III- Creatinine trending down, BUN remains elevated, Chlor/C02- mildly abnormal 2/2 CKD  * Hyperkalemia- K+4.3, stable  * Hyponatremia- Na+ 138 stable  * Hyperglycemia/DM II- -200s c/w Lantus insulin and Lispro for coverage, no change  * Debility- therapy to maximize safety and independence with functional mobility and self-care. Is functioning well  * HTN, ASHD, HLD- VSS c/w verapamil, ASA, atorvastatin.  * BPH/obstructive uropathy- c/w Flomax, and Smith catheter.  * Anemia 2/2 chronic disease H/H 8.7/28.5   * Sacral wound/foot wound- per Nsg/wound team  * Knee right knee/low back pain- c/w lidocaine patch, routine Tylenol, gabapentin  * Glaucoma- c/w with eye gtts.  Had consult for renal trending BUN/Cr, labs chronically poor, unsure if patient went, unable to locate documentation  Labs for next week    Blanca Stafford, CNP

## 2023-04-03 NOTE — PROGRESS NOTES
Nursing Home Visit  Name: Mendez Lazo  YOB: 1940  MRN: [id number]     Chief Complaint  Lab Review  Subjective  HPI:   83 y/o male with hx of congenital deaf/muteness (basic ASL), BPH, prostate cancers/p brachytherapy (2008), CKD III (baseline Cr 1.8-2.2 1/22, atrophic left kidney), DM 2 (1/22 A1c 6.4), Rhabdomyolysis, LIZZIE, obstructive and reflux uropathy, hypoglycemia, Difficulty walking, falls hydronephrosis, urinary retention, OA, ASHD, HLD, glaucoma, primary parkinsonism, PVD, sacral wound, moderate malnutrition admitted to SNF after hospital stay for rhabdo, debility. Per hospital records; patient was found down by landlord after an unwitnessed fall and admitted for rhabdomyolysis (CK 1725), LIZZIE(Cr to 3.03), and hypoglycemia to 40. On imaging found to have urinary retention/obstruction concerning for worsening BPH, prostate cancer, and/or UTI as well as bowel dilation and obstruction, though patient reports regular bowel movements. Per history,fall seems mechanical in nature vs hypoglycemic. Admission CT head showed surface contusion and no acute hemorrhage or infarct. Elbow and hip x-rays similarly showed no fractures, but advanced osteoarthritis was present in the bilateral hips. Admission CT AP showed Moderate hydroureteronephrosis of the left kidney with the ureter dilated to the level of the pelvic brim where there is no extrinsic compression or mass, markedly distended urinary bladder concerning for urinary retention, and hyperdense lesions of the right kidney. Indwelling catheter was inserted with 500ml output and patient was started on .4 mg Tamsulosin QD. Patient failed 9/10 TOV and was recatheterized 9/11 and discharged to SNF with indwelling catheter. 9/12 renal ultrasound showed a 0.5 x 0.4 x 0.4 cm echogenic lesion in the right midpole with a tiny focus of vascularity which was indeterminate. Radiology recommended considering dedicated renal MRI are CT protocol to Differentiate  between a small angiomyolipoma or solid neoplasm such as renal cell carcinoma, repeat atrophy left kidney, bilateral simple renal cysts, and no hydronephrosis was seen. Outpatient follow-up with urology for management of indwelling catheter and right kidney lesion. Patient likely with metabolic abnormalities(LIZZIE, hypoglycemia, rhabdo) in setting of prolonged period down after fall. On presentation, patient Cr elevated to 3.03, LIZZIE likely post-renal/obstructive given imaging, though possibly pre-renal with hypovolemia/infection, resolved to patients baseline of 2.1 by discharge. Rhabdomyolysis resolved as well after 2L fluid bolus. Patients course was complicated by a sacral decubitus ulcer, which was present on admission, but worsened during stay. Wound care nurse recommended ACS evaluation, and patient was taken to OR on 9/15 for debridement. Wound had 5cc of purulent fluid, and wound culture was sent; no obvious infection noted. Patient also found to have glucose of 40 on presentation which resolved with ampule of D 50. Patients 9/6 Hg A1c was 6.6, likely that patient had multiple hypoglycemic episodes on home regimen of Tresieba 15u AT BEDTIME, patient sent out on new regimen of 7 units Lantus. Patient was also normotensive on just home verapamil 120 mg BID, did not require home lisinopril 10 mg EVERY DAY or HYDROCHLOROTHIAZIDE 12.5 mg EVERY DAY for adequate control, which were held in setting of patients LIZZIE. Issues for follow-up. Wound care, please turn/reposition patient every 2 hours. Wound dressing needs to be changed twice a day with Kerlix barely soaking in Dakin's. Hypertension medication management and BPH follow-up with PCP in 2 weeks antihypertensive held 2/2 LIZZIE. Diabetes control follow-up with endocrine in 2 weeks LIZZIE on CKD III follow-up with nephrology in 2 weeks Indwelling catheter and right kidney lesion follow-up with urology in 2 weeks. Failed trial of void. He was discharged to SNF on 9/17.  Lost his appeal and was discharged to LTC and transferred to [facility] on 10/14. Has PICC line and Raphael. On 10/17 Patient's blood sugars noted to be elevated to a high of 787. Labs, UA were stable.  10/18 Patient is Difficult to converse with 2/2 him being deaf and unable to read. Was able to get out of him he was hungry. Appetite fair to good. Does not appear in pain.  10/20 Patient still with hyperglycemia, blood culture/wound culture done CBC w/DIFF results show anemia HGB (10.5), BC negative, wound culture Corynebacterium Striatum (negative). Appetite fair to good. Per Nsg is not in pain, stable. Patient's brother is here and wants to talk  10/25 Patient sitting in chair. He was able to make it known he wanted to get back into bed. Appetite good does not appear in pain  11/3 Patient stable, appetite good. Does not appear in pain. Is deaf mute and cannot read is difficult to communicate. Labs ordered for today  11/22 Patient stable had labs done on 11/17. Is stable, appetite good, Does not appear in distress.  11/29 Patient sitting room. He looks to be in good spirits and no distress or pain. Appetite good. Labs drawn 11/23 12/1 Patient sitting in chair, appetite fair to poor, but he is smiling trying to communicate. Has IV fluids running. Not sure which bag he is on. Hew does not look to be in distress. Has raphael catheter that intact  12/6 Patient in room, NAD, smiling. Appetite poor. Midline is partially dislodged.  12/8 Patient in room in good mood Appetite remains fair to poor. Attempting to communicate with patient while brother was in room. Brother was disruptive, making communicating with patient very difficult. Labs ordered  1/5 Notified of patient's labs yesterday. They were stable. He is in good spirits, Is deaf/mute and difficult to communicate with. He recently has had to have several liters IVF over the past 1-2 months. He does not appear in pain or discomfort. Appetite is good per PCC.  1/26  "Patient stable, sitting up in chair. In good spirits, appetite good. Denies pain or discomfort. Labs drawn today  2/28 Patient is sitting in chair, p/pleasant. No grimacing or pain noted. Labs drawn yesterday.  3/2 Notified by nursing patient complaining of abdominal discomfort. No urine noted in raphael. Nursing changed the raphael, minimal urine out. KUB and labs ordered. Saw patient later in d/ay. KUB did not mention bladder but did note bladder was mildly distended according to x-ray. No urine noted in raphael bag. Instructed nurse to irrigate raphael back and notify me with results. Patient in distress and uncomfortable. One unit of fluids per IV given to patient over night.   3/16  Patient stable, today labs have improved. Was given 1L of NS for worsening Bun/Cr.  Today labs look better, He is in good spirits, no c/o pain or discomfort. Patient is deaf/mute, does not read.   3/21  Patient sitting out in common area, is in good spirits.  Labs remain stable. Appetite is good. Shakes head \"yes\" when asked if he is feeling good.      3/23  Was at another facility when was I was notified patient was coding.  Arrived to see EMS with patient. KEVIN device was already in place giving chest compressions. In discussions wit nsg.  Patient was awake earlier in the day, he returned to his room before lunch.  He usually sits in chair in room to eat.  Per [facility] lunch tray was brought to his room, she thought he was sleeping, but realized he was not breathing.  Nsg states they were able to get a blood pressure and heart rate. CPR was initiated.  In discussion with EMS patient was asystole when they arrived and they never got a rhythm.  Code was called.  Patient was not transferred to hospital.  Family was notified by [facility] staff.  Patient was diagnosed with Covid yesterday   Discussed with [facility] staff, EMTs and notified Dr. Díaz.    Blanca Stafford, CARMELLA  "